# Patient Record
Sex: FEMALE | Race: WHITE | NOT HISPANIC OR LATINO | Employment: UNEMPLOYED | URBAN - METROPOLITAN AREA
[De-identification: names, ages, dates, MRNs, and addresses within clinical notes are randomized per-mention and may not be internally consistent; named-entity substitution may affect disease eponyms.]

---

## 2017-06-06 NOTE — PATIENT DISCUSSION
(H25.13) Age-related nuclear cataract, bilateral - Assesment : Examination revealed cataract. Mild symptoms. - Plan : Monitor for changes. Advised patient of condition of cataracts. Pt to call our office with decreased vision or increased symptoms. Updated GLRx given today.

## 2017-06-06 NOTE — PATIENT DISCUSSION
(H40.013) Open angle with borderline findings, low risk, bilateral - Assesment : Examination revealed suspicion for Open Angle Glaucoma. Baseline OCT ONH today. Records reviewed from Dr. Elina Shen in EHR. Pt reports had HVF testing with Dr. Donald Mcfarland. - Plan : Monitor for IOP and NFL changes q 6 months with visits and testing. Advised patient of condition and importance of monitoring. R/R for Dr. Eyad Zamudio records and testing. RTC in 6 months for HVF and IOP Check, sooner if problems or changes.

## 2017-06-06 NOTE — PATIENT DISCUSSION
(H35.371) Puckering of macula, right eye - Assesment : Examination revealed ERM. Mac OCT today. - Plan : Monitor yearly for macular changes with dilated exams and Mac OCTs. Advised patient to call our office with decreased vision or increased symptoms.

## 2017-06-06 NOTE — PATIENT DISCUSSION
(D25.011) Vitreous degeneration, left eye - Assesment : Examination revealed PVD floater OS. No changes reported. No holes or tears noted. - Plan : Monitor for changes. Advised patient to call our office with decreased vision or an increase in flashes and/or floaters.

## 2017-12-11 NOTE — PATIENT DISCUSSION
(H40.013) Open angle with borderline findings, low risk, bilateral - Assesment : Examination revealed suspicion for Open Angle Glaucoma. HVF full today. - Plan : Monitor for IOP and NFL changes q 6 months with visits and testing. RTC in 6 months for Exam and OCT ONH, sooner if problems or changes.

## 2018-05-24 NOTE — PATIENT DISCUSSION
DRY EYES : Discussed with patient the importance of keeping the eye moist and the symptoms associated with dry eyes including blurry vision, tearing, burning, and gertrude sensation. Advised patient to minimize use of any fans blowing directly on the face. Advised patient to continue with artificial tears 2-3 times daily.

## 2018-05-24 NOTE — PATIENT DISCUSSION
DERMATOCHALASIS OU:  VISUALLY SIGNIFICANT AT THIS TIME. PATIENT WISHES TO WAIT AT THIS TIME. WILL CALL WHEN READY. ALSO POSSIBLE PTOSIS. RETURN FOR FOLLOW-UP AS SCHEDULED.

## 2018-05-24 NOTE — PATIENT DISCUSSION
LESION LLL  : I have discussed options with the patient, surgery versus follow. The risks, benefits, alternatives and alternatives include anesthesia, bleeding, infection, inflammation, swelling, bruising. The patient understands and desires to watch the lesion and call the office for re-evaluation if any changes in size or color occur.

## 2018-05-24 NOTE — PATIENT DISCUSSION
PTERYGIUM:  I have advised the patient that pterygium are usually stable over time and that surgical intervention may be necessary if condition becomes visually significant or severely inflamed. The patient should avoid dry, joaquín conditions and excessive sunlight. Artificial tears and sunglasses may help prevent exacerbation of the condition.

## 2018-05-24 NOTE — PATIENT DISCUSSION
Herpes Zoster, Shingles: None active, Dr Sofy Busch discussed with patient when flare occurs and is affected around the eye, call office and come in. Also to Maintain a proper diet.

## 2018-06-04 NOTE — PATIENT DISCUSSION
(H40.013) Open angle with borderline findings, low risk, bilateral - Assesment : Examination revealed suspicion for Open Angle Glaucoma. OCT [facility] [place], [place] performed as ordered for evaluation of nerves due to glaucoma suspect: wnl and stable today. - Plan : Monitor closely for IOP and NFL changes q 6 months with visits and testing (OCTs and HVFs). RTC in 6 months for HVF and IOP Check sooner if problems or changes.

## 2018-06-04 NOTE — PATIENT DISCUSSION
(W64.173) Vitreous degeneration, left eye - Assesment : Examination revealed PVD floater OS. Longstanding with no changes reported. No holes or tears noted. - Plan : Monitor for changes. Advised patient to call our office with decreased vision or an increase in flashes and/or floaters.

## 2018-12-06 NOTE — PATIENT DISCUSSION
(H40.013) Open angle with borderline findings, low risk, bilateral - Assesment : Examination revealed suspicion for Open Angle Glaucoma. HVF performed: stable today. Gonio performed today. - Plan : Monitor closely for IOP and NFL changes q 6 months with visits, OCTs, and HVFs. RTC in 6 months for Exam and OCT ONH, sooner if problems or changes.

## 2019-06-04 NOTE — PATIENT DISCUSSION
(H35.768) Drusen (degenerative) of macula, bilateral - Assesment : Examination revealed rare Drusen. - Plan : Monitor for changes. Eat healthy and wear sunglasses. Advised patient to call our office with decreased vision or increased symptoms.

## 2019-06-04 NOTE — PATIENT DISCUSSION
(H40.013) Open angle with borderline findings, low risk, bilateral - Assesment : Examination revealed suspicion for Open Angle Glaucoma. OCT ONH performed today. - Plan : Monitor closely for IOP and NFL changes q 6 months with visits, OCTs, and HVFs. RTC in 6 months for HVF and IOP Check, sooner if problems or changes.

## 2019-06-04 NOTE — PATIENT DISCUSSION
(H25.13) Age-related nuclear cataract, bilateral - Assesment : Examination revealed cataract. - Plan : Monitor for changes. Updated GLRx given today. Advised patient of condition of cataracts and discussed symptoms of cataracts worsening and becoming more bothersome. Pt to call if vision changes or becomes more bothered by visual symptoms before next appt.

## 2019-06-04 NOTE — PATIENT DISCUSSION
(X70.668) Vitreous degeneration, left eye - Assesment : Examination revealed PVD. No changes reported. No holes or tears noted today. - Plan : Monitor for changes. Advised patient to call our office with any decreased vision or any increase in flashes and/or floaters.

## 2020-05-21 ENCOUNTER — ESTABLISHED COMPREHENSIVE EXAM (OUTPATIENT)
Dept: URBAN - METROPOLITAN AREA CLINIC 40 | Facility: CLINIC | Age: 50
End: 2020-05-21

## 2020-05-21 DIAGNOSIS — H53.8: ICD-10-CM

## 2020-05-21 PROCEDURE — 92014 COMPRE OPH EXAM EST PT 1/>: CPT

## 2020-05-21 PROCEDURE — 92015 DETERMINE REFRACTIVE STATE: CPT

## 2020-05-21 PROCEDURE — 92499OP2 OPTOMAP RETINAL SCREENING BOTH EYES

## 2020-05-21 ASSESSMENT — TONOMETRY
OS_IOP_MMHG: 11
OD_IOP_MMHG: 11

## 2020-05-21 ASSESSMENT — VISUAL ACUITY
OS_CC: J5
OU_SC: 20/20
OU_CC: J2
OD_SC: 20/20
OS_SC: 20/40-1
OU_SC: J5
OD_SC: J5
OS_SC: J10
OD_CC: J2

## 2020-05-21 ASSESSMENT — KERATOMETRY
OS_K1POWER_DIOPTERS: 42.75
OS_K2POWER_DIOPTERS: 46.25
OS_AXISANGLE_DEGREES: 154
OD_AXISANGLE_DEGREES: 16
OD_K1POWER_DIOPTERS: 42.75
OD_K2POWER_DIOPTERS: 44
OS_AXISANGLE2_DEGREES: 64
OD_AXISANGLE2_DEGREES: 106

## 2021-06-29 ENCOUNTER — ESTABLISHED COMPREHENSIVE EXAM (OUTPATIENT)
Dept: URBAN - METROPOLITAN AREA CLINIC 40 | Facility: CLINIC | Age: 51
End: 2021-06-29

## 2021-06-29 DIAGNOSIS — H52.4: ICD-10-CM

## 2021-06-29 DIAGNOSIS — H25.13: ICD-10-CM

## 2021-06-29 PROCEDURE — 92015 DETERMINE REFRACTIVE STATE: CPT

## 2021-06-29 PROCEDURE — 92499OP2 OPTOMAP RETINAL SCREENING BOTH EYES

## 2021-06-29 PROCEDURE — 92014 COMPRE OPH EXAM EST PT 1/>: CPT

## 2021-06-29 ASSESSMENT — KERATOMETRY
OS_K2POWER_DIOPTERS: 46.25
OD_AXISANGLE_DEGREES: 16
OD_K2POWER_DIOPTERS: 44
OS_AXISANGLE2_DEGREES: 64
OD_AXISANGLE2_DEGREES: 106
OS_K1POWER_DIOPTERS: 42.75
OS_AXISANGLE_DEGREES: 154
OD_K1POWER_DIOPTERS: 42.75

## 2021-06-29 ASSESSMENT — VISUAL ACUITY
OU_SC: J3
OD_SC: J3
OD_SC: 20/20
OD_CC: J1
OS_SC: 20/50+2
OS_CC: J1
OU_CC: J1+
OS_SC: J5
OU_SC: 20/20

## 2022-11-10 ASSESSMENT — KERATOMETRY
OD_AXISANGLE2_DEGREES: 106
OD_AXISANGLE_DEGREES: 16
OD_K2POWER_DIOPTERS: 44
OD_K1POWER_DIOPTERS: 42.75
OS_K1POWER_DIOPTERS: 42.75
OS_AXISANGLE2_DEGREES: 64
OS_K2POWER_DIOPTERS: 46.25
OS_AXISANGLE_DEGREES: 154

## 2022-11-11 ENCOUNTER — COMPREHENSIVE EXAM (OUTPATIENT)
Dept: URBAN - METROPOLITAN AREA CLINIC 40 | Facility: CLINIC | Age: 52
End: 2022-11-11

## 2022-11-11 DIAGNOSIS — H25.13: ICD-10-CM

## 2022-11-11 DIAGNOSIS — H52.202: ICD-10-CM

## 2022-11-11 DIAGNOSIS — H52.4: ICD-10-CM

## 2022-11-11 PROCEDURE — 92499OP2 OPTOMAP RETINAL SCREENING BOTH EYES

## 2022-11-11 PROCEDURE — 92014 COMPRE OPH EXAM EST PT 1/>: CPT

## 2022-11-11 PROCEDURE — 92015 DETERMINE REFRACTIVE STATE: CPT

## 2022-11-11 ASSESSMENT — TONOMETRY
OD_IOP_MMHG: 11
OS_IOP_MMHG: 11

## 2022-11-11 ASSESSMENT — VISUAL ACUITY
OS_SC: J12
OD_SC: J12
OD_CC: J4 @ 16"
OS_CC: J4 @ 16"
OD_SC: 20/40+1